# Patient Record
Sex: FEMALE | Race: BLACK OR AFRICAN AMERICAN
[De-identification: names, ages, dates, MRNs, and addresses within clinical notes are randomized per-mention and may not be internally consistent; named-entity substitution may affect disease eponyms.]

---

## 2018-03-11 ENCOUNTER — HOSPITAL ENCOUNTER (EMERGENCY)
Dept: HOSPITAL 61 - ER | Age: 23
Discharge: HOME | End: 2018-03-11
Payer: SELF-PAY
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
## 2018-03-11 DIAGNOSIS — T65.891A: Primary | ICD-10-CM

## 2018-03-11 DIAGNOSIS — H57.11: ICD-10-CM

## 2018-03-11 PROCEDURE — 99283 EMERGENCY DEPT VISIT LOW MDM: CPT

## 2018-03-11 RX ADMIN — FLUORESCEIN SODIUM 1 STRIP: 0.6 STRIP OPHTHALMIC at 20:29

## 2018-03-11 RX ADMIN — TETRACAINE HYDROCHLORIDE 1 DROP: 5 SOLUTION OPHTHALMIC at 20:29

## 2019-07-08 ENCOUNTER — HOSPITAL ENCOUNTER (EMERGENCY)
Dept: HOSPITAL 61 - ER | Age: 24
Discharge: HOME | End: 2019-07-08
Payer: SELF-PAY

## 2019-07-08 VITALS — SYSTOLIC BLOOD PRESSURE: 137 MMHG | DIASTOLIC BLOOD PRESSURE: 73 MMHG

## 2019-07-08 VITALS — HEIGHT: 63 IN | WEIGHT: 182.5 LBS | BODY MASS INDEX: 32.34 KG/M2

## 2019-07-08 DIAGNOSIS — O23.591: Primary | ICD-10-CM

## 2019-07-08 DIAGNOSIS — R10.84: ICD-10-CM

## 2019-07-08 DIAGNOSIS — R11.0: ICD-10-CM

## 2019-07-08 DIAGNOSIS — B96.89: ICD-10-CM

## 2019-07-08 DIAGNOSIS — Z3A.01: ICD-10-CM

## 2019-07-08 LAB
ALBUMIN SERPL-MCNC: 3.4 G/DL (ref 3.4–5)
ALBUMIN/GLOB SERPL: 0.8 {RATIO} (ref 1–1.7)
ALP SERPL-CCNC: 82 U/L (ref 46–116)
ALT SERPL-CCNC: 28 U/L (ref 14–59)
AMORPH SED URNS QL MICRO: PRESENT /HPF
AMPHETAMINE/METHAMPHETAMINE: (no result)
ANION GAP SERPL CALC-SCNC: 6 MMOL/L (ref 6–14)
APTT PPP: YELLOW S
AST SERPL-CCNC: 17 U/L (ref 15–37)
BACTERIA #/AREA URNS HPF: 0 /HPF
BARBITURATES UR-MCNC: (no result) UG/ML
BASOPHILS # BLD AUTO: 0.1 X10^3/UL (ref 0–0.2)
BASOPHILS NFR BLD: 1 % (ref 0–3)
BENZODIAZ UR-MCNC: (no result) UG/L
BILIRUB SERPL-MCNC: 0.2 MG/DL (ref 0.2–1)
BILIRUB UR QL STRIP: NEGATIVE
BUN SERPL-MCNC: 11 MG/DL (ref 7–20)
BUN/CREAT SERPL: 14 (ref 6–20)
CALCIUM SERPL-MCNC: 8.8 MG/DL (ref 8.5–10.1)
CANNABINOIDS UR-MCNC: (no result) UG/L
CHLORIDE SERPL-SCNC: 103 MMOL/L (ref 98–107)
CO2 SERPL-SCNC: 29 MMOL/L (ref 21–32)
COCAINE UR-MCNC: (no result) NG/ML
CREAT SERPL-MCNC: 0.8 MG/DL (ref 0.6–1)
EOSINOPHIL NFR BLD: 0.3 X10^3/UL (ref 0–0.7)
EOSINOPHIL NFR BLD: 4 % (ref 0–3)
ERYTHROCYTE [DISTWIDTH] IN BLOOD BY AUTOMATED COUNT: 14.5 % (ref 11.5–14.5)
FIBRINOGEN PPP-MCNC: (no result) MG/DL
GFR SERPLBLD BASED ON 1.73 SQ M-ARVRAT: 105.8 ML/MIN
GLOBULIN SER-MCNC: 4.3 G/DL (ref 2.2–3.8)
GLUCOSE SERPL-MCNC: 89 MG/DL (ref 70–99)
HCT VFR BLD CALC: 38.1 % (ref 36–47)
HGB BLD-MCNC: 12.6 G/DL (ref 12–15.5)
LIPASE: 137 U/L (ref 73–393)
LYMPHOCYTES # BLD: 2.1 X10^3/UL (ref 1–4.8)
LYMPHOCYTES NFR BLD AUTO: 25 % (ref 24–48)
MCH RBC QN AUTO: 27 PG (ref 25–35)
MCHC RBC AUTO-ENTMCNC: 33 G/DL (ref 31–37)
MCV RBC AUTO: 81 FL (ref 79–100)
METHADONE SERPL-MCNC: (no result) NG/ML
MONO #: 0.6 X10^3/UL (ref 0–1.1)
MONOCYTES NFR BLD: 7 % (ref 0–9)
NEUT #: 5.2 X10^3UL (ref 1.8–7.7)
NEUTROPHILS NFR BLD AUTO: 63 % (ref 31–73)
NITRITE UR QL STRIP: NEGATIVE
OPIATES UR-MCNC: (no result) NG/ML
PCP SERPL-MCNC: (no result) MG/DL
PH UR STRIP: 7.5 [PH]
PLATELET # BLD AUTO: 237 X10^3/UL (ref 140–400)
POTASSIUM SERPL-SCNC: 3.6 MMOL/L (ref 3.5–5.1)
PROT SERPL-MCNC: 7.7 G/DL (ref 6.4–8.2)
PROT UR STRIP-MCNC: NEGATIVE MG/DL
RBC # BLD AUTO: 4.72 X10^6/UL (ref 3.5–5.4)
RBC #/AREA URNS HPF: 0 /HPF (ref 0–2)
SODIUM SERPL-SCNC: 138 MMOL/L (ref 136–145)
SQUAMOUS #/AREA URNS LPF: (no result) /LPF
U PREG PATIENT: POSITIVE
UROBILINOGEN UR-MCNC: 1 MG/DL
WBC # BLD AUTO: 8.2 X10^3/UL (ref 4–11)
WBC #/AREA URNS HPF: (no result) /HPF (ref 0–4)

## 2019-07-08 PROCEDURE — 84702 CHORIONIC GONADOTROPIN TEST: CPT

## 2019-07-08 PROCEDURE — 87591 N.GONORRHOEAE DNA AMP PROB: CPT

## 2019-07-08 PROCEDURE — 81001 URINALYSIS AUTO W/SCOPE: CPT

## 2019-07-08 PROCEDURE — 76817 TRANSVAGINAL US OBSTETRIC: CPT

## 2019-07-08 PROCEDURE — 76801 OB US < 14 WKS SINGLE FETUS: CPT

## 2019-07-08 PROCEDURE — 99285 EMERGENCY DEPT VISIT HI MDM: CPT

## 2019-07-08 PROCEDURE — 85025 COMPLETE CBC W/AUTO DIFF WBC: CPT

## 2019-07-08 PROCEDURE — 80053 COMPREHEN METABOLIC PANEL: CPT

## 2019-07-08 PROCEDURE — 81025 URINE PREGNANCY TEST: CPT

## 2019-07-08 PROCEDURE — 83690 ASSAY OF LIPASE: CPT

## 2019-07-08 PROCEDURE — 36415 COLL VENOUS BLD VENIPUNCTURE: CPT

## 2019-07-08 PROCEDURE — 87491 CHLMYD TRACH DNA AMP PROBE: CPT

## 2019-07-08 PROCEDURE — 80307 DRUG TEST PRSMV CHEM ANLYZR: CPT

## 2019-07-08 NOTE — PHYS DOC
Past Medical History


Past Medical History:  No Pertinent History


 (TYRON BOLANOS)


Past Surgical History:  No Surgical History


 (TYRON BOLANOS)


Alcohol Use:  None


Drug Use:  None


 (TRYON BOLANOS)





Adult General


Chief Complaint


Chief Complaint:  ABDOMINAL PAIN IN PREGNANCY





HPI


HPI





Patient is a 24  year old female who presents with generalized 9 out of 10 

abdominal pain described as cramping that began today after she found out she is

pregnant. She states her last menstrual cycle was 2019. Patient denies 

any vaginal bleeding. She is complaining of nausea. She is a  3 para 1 

with one miscarriage. Denies anything exacerbating or relieving her pain.


 (TYRON BOLANOS)





Review of Systems


Review of Systems





Constitutional: Denies fever or chills []


Eyes: Denies change in visual acuity, redness, or eye pain []


HENT: Denies nasal congestion or sore throat []


Respiratory: Denies cough or shortness of breath []


Cardiovascular: No additional information not addressed in HPI []


GI: Reports abdominal pain, nausea, denies vomiting, bloody stools or diarrhea 

[]


: Denies dysuria or hematuria []


Musculoskeletal: Denies back pain or joint pain []


Integument: Denies rash or skin lesions []


Neurologic: Denies headache, focal weakness or sensory changes []


Endocrine: Denies polyuria or polydipsia []





All other systems were reviewed and found to be within normal limits, except as 

documented in this note.


 (TYRON BOLANOS)





Allergies


Allergies





Allergies








Coded Allergies Type Severity Reaction Last Updated Verified


 


  No Known Drug Allergies    16 No





 (SHUN BRENNER MD)





Physical Exam


Physical Exam





Constitutional: Well developed, well nourished, no acute distress, non-toxic 

appearance. []


HENT: Normocephalic, atraumatic, bilateral external ears normal, oropharynx 

moist, no oral exudates, nose normal. []


Eyes: PERRLA, EOMI, conjunctiva normal, no discharge. [] 


Neck: Normal range of motion, no tenderness, supple, no stridor. [] 


Cardiovascular:Heart rate regular rhythm, no murmur []


Lungs & Thorax:  Bilateral breath sounds clear to auscultation []


Abdomen: Bowel sounds normal, soft, no tenderness, no masses, no pulsatile 

masses. []


Pelvic exam


External pelvic appears normal, cervix is visualized, closed, no CMT, no adnexal

 tenderness, trace amount of white discharge in the vaginal vault. 


Skin: Warm, dry, no erythema, no rash. [] 


Back: No tenderness, no CVA tenderness. [] 


Extremities: No tenderness, no cyanosis, no clubbing, ROM intact, no edema. [] 


Neurologic: Alert and oriented X 3, normal motor function, normal sensory 

function, no focal deficits noted. []


Psychologic: Affect normal, judgement normal, mood normal. []


 (TYRON BOLANOS)





Current Patient Data


Vital Signs





                                   Vital Signs








  Date Time  Temp Pulse Resp B/P (MAP) Pulse Ox O2 Delivery O2 Flow Rate FiO2


 


19 18:22  89  137/73 (94)  Room Air  


 


19 17:22     99   


 


19 16:12 97.8  14     





 97.8       





 (SHUN BRENNER MD)


Lab Values





                                Laboratory Tests








Test


 19


15:30 19


16:35


 


Urine Collection Type Unknown   


 


Urine Color Yellow   


 


Urine Clarity Cloudy   


 


Urine pH 7.5   


 


Urine Specific Gravity 1.025   


 


Urine Protein


 Negative mg/dL


(NEG-TRACE) 





 


Urine Glucose (UA)


 Negative mg/dL


(NEG) 





 


Urine Ketones (Stick)


 Negative mg/dL


(NEG) 





 


Urine Blood


 Negative (NEG)


 





 


Urine Nitrite


 Negative (NEG)


 





 


Urine Bilirubin


 Negative (NEG)


 





 


Urine Urobilinogen Dipstick


 1.0 mg/dL (0.2


mg/dL) 





 


Urine Leukocyte Esterase Small (NEG)   


 


Urine RBC 0 /HPF (0-2)   


 


Urine WBC


 1-4 /HPF (0-4)


 





 


Urine Squamous Epithelial


Cells Many /LPF  


 





 


Urine Amorphous Sediment Present /HPF   


 


Urine Bacteria


 0 /HPF (0-FEW)


 





 


Urine Mucus Mod /LPF   


 


Urine Pregnancy Test


 Positive (NEG)


 





 


Urine Opiates Screen Neg (NEG)   


 


Urine Methadone Screen Neg (NEG)   


 


Urine Barbiturates Neg (NEG)   


 


Urine Phencyclidine Screen Neg (NEG)   


 


Urine


Amphetamine/Methamphetamine Neg (NEG)  


 





 


Urine Benzodiazepines Screen Neg (NEG)   


 


Urine Cocaine Screen Pos (NEG)   


 


Urine Cannabinoids Screen Neg (NEG)   


 


Urine Ethyl Alcohol Neg (NEG)   


 


White Blood Count


 


 8.2 x10^3/uL


(4.0-11.0)


 


Red Blood Count


 


 4.72 x10^6/uL


(3.50-5.40)


 


Hemoglobin


 


 12.6 g/dL


(12.0-15.5)


 


Hematocrit


 


 38.1 %


(36.0-47.0)


 


Mean Corpuscular Volume


 


 81 fL ()





 


Mean Corpuscular Hemoglobin  27 pg (25-35)  


 


Mean Corpuscular Hemoglobin


Concent 


 33 g/dL


(31-37)


 


Red Cell Distribution Width


 


 14.5 %


(11.5-14.5)


 


Platelet Count


 


 237 x10^3/uL


(140-400)


 


Neutrophils (%) (Auto)  63 % (31-73)  


 


Lymphocytes (%) (Auto)  25 % (24-48)  


 


Monocytes (%) (Auto)  7 % (0-9)  


 


Eosinophils (%) (Auto)  4 % (0-3)  H


 


Basophils (%) (Auto)  1 % (0-3)  


 


Neutrophils # (Auto)


 


 5.2 x10^3uL


(1.8-7.7)


 


Lymphocytes # (Auto)


 


 2.1 x10^3/uL


(1.0-4.8)


 


Monocytes # (Auto)


 


 0.6 x10^3/uL


(0.0-1.1)


 


Eosinophils # (Auto)


 


 0.3 x10^3/uL


(0.0-0.7)


 


Basophils # (Auto)


 


 0.1 x10^3/uL


(0.0-0.2)


 


Maternal Serum HCG Beta


Subunit 


 3767 mIU/mL


(0-5)  H


 


Sodium Level


 


 138 mmol/L


(136-145)


 


Potassium Level


 


 3.6 mmol/L


(3.5-5.1)


 


Chloride Level


 


 103 mmol/L


()


 


Carbon Dioxide Level


 


 29 mmol/L


(21-32)


 


Anion Gap  6 (6-14)  


 


Blood Urea Nitrogen


 


 11 mg/dL


(7-20)


 


Creatinine


 


 0.8 mg/dL


(0.6-1.0)


 


Estimated GFR


(Cockcroft-Gault) 


 105.8  





 


BUN/Creatinine Ratio  14 (6-20)  


 


Glucose Level


 


 89 mg/dL


(70-99)


 


Calcium Level


 


 8.8 mg/dL


(8.5-10.1)


 


Total Bilirubin


 


 0.2 mg/dL


(0.2-1.0)


 


Aspartate Amino Transferase


(AST) 


 17 U/L (15-37)





 


Alanine Aminotransferase (ALT)


 


 28 U/L (14-59)





 


Alkaline Phosphatase


 


 82 U/L


()


 


Total Protein


 


 7.7 g/dL


(6.4-8.2)


 


Albumin


 


 3.4 g/dL


(3.4-5.0)


 


Albumin/Globulin Ratio


 


 0.8 (1.0-1.7)


L


 


Lipase


 


 137 U/L


()


 


Ethyl Alcohol Level


 


 < 3 mg/dL


(0-10)





                                Laboratory Tests


19 16:35








                                Laboratory Tests


19 16:35











Microbiology


19 Wet Prep - Final, Complete


         


 (SHUN BRENNER MD)





EKG


EKG


[]


 (TYRON BOLANOS)





Radiology/Procedures


Radiology/Procedures


[]PROCEDURE: OB <14 WKS W/TV





Transabdominal and transvaginal obstetrical ultrasound less than 14 weeks.


 


HISTORY: Abdominal pain


 


Transabdominal ultrasound was performed. Uterus is normal in size and 


appearance measuring 8.7 x 3.5 x 4.7 cm. Endometrium was 1 cm. Ovaries 


were poorly cyst seen.


 


Transvaginal imaging was performed for further evaluation. There is a 


cystic focus at the fundus of the uterus possibly a gestation with a mean 


sac size of 0.4 cm corresponding to 5 weeks 1 day gestational age. A fetal


pole was not identified. Follow-up will be necessary to determine fetal 


viability.


 


Right ovary measured 4.4 x 3.3 x 2.8 cm. There is a corpus lutein cyst in 


the right ovary measuring 2.1 cm. Left ovary was normal measuring 3.1 x 


1.5 x 1.7 cm. There is flow in both ovaries with color imaging and 


Doppler. There is no free fluid in the pelvis.


 


IMPRESSION:


1. Probable gestational sac within the uterus with a mean sac size of 0.4 


cm corresponding to 5 weeks 1 day gestational age. Estimated date of 


delivery by ultrasound is 3/8/2020.


2. Follow-up will be necessary to determine fetal viability as the fetal 


pole was not identified.


 


Electronically signed by: Alton Olivia MD (2019 5:43 PM) [facility]














DICTATED and SIGNED BY:     ALTON OLIVIA MD


DATE:     19 1744


 (TYRON BOLANOS)





Course & Med Decision Making


Course & Med Decision Making


Pertinent Labs and Imaging studies reviewed. (See chart for details)





This is a 24-year-old female patient presenting to the ED today complaining of 

abdominal pain in pregnancy. Last menstrual cycle was 2019. She is a 

 3 para 1 with one miscarriage





Positive urine hCG, beta-hCG 3767, CBC, CMP-no acute findings. Urine analysis is

 noted for small amount of leukocytes this urine appears grossly contaminated.





Wet prep noted for BV-will d/c with flagyl





OB ultrasound noted for- Probable gestational sac within the uterus with a mean 

sac size of 0.4 cm corresponding to 5 weeks 1 day gestational age





Patient was provided instructions to follow-up with OB.








 (TYRON BOLANOS)


Course & Med Decision Making





Staff Physician Addendum:


I was working in the ER during the course of this patient's visit.  I was 

available for consultation as needed, but I was not directly involved in the 

care of this patient.    


 (SHUN BRENNER MD)


Dragon Disclaimer


Dragon Disclaimer


This electronic medical record was generated, in whole or in part, using a voice

 recognition dictation system.


 (TYRON BOLANOS)





Departure


Departure


Impression:  


   Primary Impression:  


   Abdominal pain in pregnancy


   Additional Impression:  


   Bacterial vaginosis in pregnancy


Disposition:   HOME, SELF-CARE


Condition:  STABLE


Referrals:  


NO PCP (PCP)








ZACHARY IVERSON MD


follow up in 1 week


Patient Instructions:  Abdominal Pain During Pregnancy, Bacterial Vaginosis





Additional Instructions:  


You were evaluated in the emergency room for abdominal pain in pregnancy, your 

ultrasound shows you are  5 weeks 1 day pregnant. You also have bacterial 

vaginosis. We put you on antibiotics. Make sure you complete them. Follow-up w

ith your own OB/GYN in the next 1 week.


Scripts


Metronidazole (FLAGYL) 500 Mg Tablet


1 TAB PO BID, #14 TAB


   Prov: TYRON BOLANOS         19





Problem Qualifiers








   Primary Impression:  


   Abdominal pain in pregnancy


   Trimester:  first trimester  Qualified Codes:  O26.891 - Other specified 

   pregnancy related conditions, first trimester; R10.9 - Unspecified abdominal 

   pain








TYRON BOLANOS               2019 17:48


SHUN BRENNER MD             2019 19:31

## 2019-07-08 NOTE — RAD
Transabdominal and transvaginal obstetrical ultrasound less than 14 weeks.

 

HISTORY: Abdominal pain

 

Transabdominal ultrasound was performed. Uterus is normal in size and 

appearance measuring 8.7 x 3.5 x 4.7 cm. Endometrium was 1 cm. Ovaries 

were poorly cyst seen.

 

Transvaginal imaging was performed for further evaluation. There is a 

cystic focus at the fundus of the uterus possibly a gestation with a mean 

sac size of 0.4 cm corresponding to 5 weeks 1 day gestational age. A fetal

pole was not identified. Follow-up will be necessary to determine fetal 

viability.

 

Right ovary measured 4.4 x 3.3 x 2.8 cm. There is a corpus lutein cyst in 

the right ovary measuring 2.1 cm. Left ovary was normal measuring 3.1 x 

1.5 x 1.7 cm. There is flow in both ovaries with color imaging and 

Doppler. There is no free fluid in the pelvis.

 

IMPRESSION:

1. Probable gestational sac within the uterus with a mean sac size of 0.4 

cm corresponding to 5 weeks 1 day gestational age. Estimated date of 

delivery by ultrasound is 3/8/2020.

2. Follow-up will be necessary to determine fetal viability as the fetal 

pole was not identified.

 

Electronically signed by: Alton Olivia MD (7/8/2019 5:43 PM) [facility]

## 2021-07-06 ENCOUNTER — HOSPITAL ENCOUNTER (EMERGENCY)
Dept: HOSPITAL 61 - ER | Age: 26
Discharge: HOME | End: 2021-07-06
Payer: COMMERCIAL

## 2021-07-06 VITALS — DIASTOLIC BLOOD PRESSURE: 76 MMHG | SYSTOLIC BLOOD PRESSURE: 124 MMHG

## 2021-07-06 VITALS — WEIGHT: 180.34 LBS | BODY MASS INDEX: 30.79 KG/M2 | HEIGHT: 64 IN

## 2021-07-06 DIAGNOSIS — J06.9: Primary | ICD-10-CM

## 2021-07-06 DIAGNOSIS — F17.200: ICD-10-CM

## 2021-07-06 PROCEDURE — 99283 EMERGENCY DEPT VISIT LOW MDM: CPT

## 2021-07-06 NOTE — PHYS DOC
Past Medical History


Past Medical History:  No Pertinent History


Past Surgical History:  No Surgical History


Smoking Status:  Current Every Day Smoker


Alcohol Use:  None


Drug Use:  None





General Adult


EDM:


Chief Complaint:  MULTIPLE COMPLAINTS





HPI:


HPI:





Patient is a 26  year old female presents emergency department chief complaint 

of upper respiratory infection signs and symptoms for the past 3 days.  Patient 

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

she has a "URI "and she should go to the ER and get started on Sudafed and Alis

dryl.  Patient states she was given a summer cold cocktail that contained 

vitamin C and B12 supplements that did not seem to help today.  Patient states 

that she has nasal congestion with a dry cough and sinus pains that seem to get 

worse at night, when she wakes up and goes to work her symptoms seem to resolve 

however at night she has a hard time sleeping because of her congestion.  

Patient currently denies chest pains, shortness of breath, throat pain, ear 

pains, visual changes, headaches, or dizziness.  Patient denies nausea, 

vomiting, diarrhea, constipation or abdominal pains.  Patient denies fever or 

chills.  Patient denies rashes of her skin.  Patient denies any other physical 

complaints or physical concerns.





Review of Systems:


Review of Systems:


14 body systems of review of systems have been reviewed.  See HPI for pertinent 

positives and negative responses, otherwise all other systems are negative, 

nonpertinent or noncontributory.


Constitutional: Negative except as outlined in HPI above.


Skin: Negative except as outlined in HPI above.


Eyes:   Negative except as outlined in HPI above.


HENT: Negative except as outlined in HPI above.


Respiratory:   Negative except as outlined in HPI above.


Cardiovascular:   Negative except as outlined in HPI above.


GI:   Negative except as outlined in HPI above.


:  Negative except as outlined in HPI above.


Musculoskeletal:   Negative except as outlined in HPI above.


Integument:   Negative except as outlined in HPI above.


Neurologic:   Negative except as outlined in HPI above.


Endocrine:   Negative except as outlined in HPI above.


Lymphatic:  Negative except as outlined in HPI above.


Psychiatric:  Negative except as outlined in HPI above.





Heart Score:


C/O Chest Pain:  No


Risk Factors:


Risk Factors:  DM, Current or recent (<one month) smoker, HTN, HLP, family hi

story of CAD, obesity.


Risk Scores:


Score 0 - 3:  2.5% MACE over next 6 weeks - Discharge Home


Score 4 - 6:  20.3% MACE over next 6 weeks - Admit for Clinical Observation


Score 7 - 10:  72.7% MACE over next 6 weeks - Early Invasive Strategies





Allergies:


Allergies:





Allergies








Coded Allergies Type Severity Reaction Last Updated Verified


 


  No Known Drug Allergies    9/26/16 No











Physical Exam:


PE:


General: Appears well, nontoxic, uncomfortable, 26-year-old female no apparent 

distress.


Skin: Warm, dry.  Normal for ethnicity.


HEENT: Atraumatic, PERRLA, rhinorrhea and congestion, nasal turbinates boggy 

bilaterally.  Moist mucous membranes, uvula midline, maintaining secretions, no 

phonation changes.  No lymphadenopathy of the head or neck appreciated.  No 

postnasal drip.


Neck: Trachea midline, normal ROM.  No stridor.


Respiratory: Normal WOB.  CTAB without R/R.  No tachypnea.  No adventitious lung

sounds appreciated.


Cardiovascular: Regular rate and rhythm, normal peripheral perfusion, no 

cyanosis appreciated.


Abdomen: Soft, nontender, no distention.


Back: Normal range of motion.


Musculoskeletal: No swelling or deformity.


Neuro: Alert and oriented x4.  MAEE.


Psych: Normal affect and mood.





Current Patient Data:


Vital Signs:





                                   Vital Signs








  Date Time  Temp Pulse Resp B/P (MAP) Pulse Ox O2 Delivery O2 Flow Rate FiO2


 


7/6/21 19:52 98.6 94 20 141/80 (94) 98 Room Air  





 98.6       











EKG:


EKG:


[]





Radiology/Procedures:


Radiology/Procedures:


[]





Course & Med Decision Making:


Course & Med Decision Making


Pertinent Labs and Imaging studies reviewed. (See chart for details)





26-year-old female, vital signs reviewed, presents emergency department with 

complaints of upper respiratory infection type signs and symptoms.  Physical 

presentation and examination consistent with viral URI.  Discussed findings with

 patient, will start with Benadryl and Sudafed here in the emergency department.

  Recommended to patient over-the-counter Flonase, Benadryl, Sudafed, Zyrtec or 

Claritin, Robitussin-DM if chest congestion signs and symptoms arise.





Patient is asking for work excuse, patient will be given work excuse for today 

and tomorrow.





Patient gave verbal understanding of discharge home instructions, follow-up with

 PCP soon, over-the-counter medication use, return to ER precautions and 

concerns, patient was hemodynamically stable, nontoxic in appearance, in no 

apparent distress at discharge time, patient was discharged home without 

incident.





Dragon Disclaimer:


Dragon Disclaimer:


This electronic medical record was generated, in whole or in part, using a voice

 recognition dictation system.





Departure


Departure


Impression:  


   Primary Impression:  


   URI (upper respiratory infection)


   Qualified Codes:  J06.9 - Acute upper respiratory infection, unspecified


Disposition:  01 HOME / SELF CARE / HOMELESS


Condition:  GOOD


Referrals:  


NO PCP (PCP)


Patient Instructions:  Upper Respiratory Infection, Adult





Additional Instructions:  


You were seen today in the emergency department for upper respiratory infection 

type signs and symptoms.  We have discussed home treatment with Benadryl, Zyrtec

 or Claritin, over-the-counter Flonase nasal spray, Benadryl.  Please obtain 

these from your pharmacy and take as directed.  We also discussed if symptoms 

progress to your chest and you develop a productive cough, you may also start 

with over-the-counter Robitussin-DM.  However I do not believe you need 

Robitussin-DM at this time.  Please increase your fluid intake as this will help

 loosen secretions and lessen symptoms.  Please follow-up with your primary care

 physician soon for reevaluation.  I have provided you with a list of healthcare

 community resources to find a primary healthcare provider.  Please return to 

the emergency department for worsening symptoms or other concerns.  It was a 

pleasure taking care of you today in the emergency department and I thank you 

for allowing me to participate in your emergency healthcare needs.





EMERGENCY DEPARTMENT GENERAL DISCHARGE INSTRUCTIONS





Thank you for coming to [facility] Emergency Department (ED) 

today and 


trusting us with you care.  We trust that you had a positive experience in our 

Emergency 


Department.  If you wish to speak to the department management, you may call the

 Director at 


[phone].





YOUR FOLLOW UP INSTRUCTIONS ARE AS FOLLOWS:





1.  Do you have a private Doctor?  If you do not have a private doctor, please 

ask for a 


resource list of physicians or clinics that may be able to assist you with 

follow up care.





2.  The Emergency Physicain has interpreted your x-rays.  The X-Ray specialist 

will also 


review them.  If there is a change in the findings, you will be notified in 48 

hours when at 


all possible.





3.  A lab test or culture has been done, your results will be reviewed and you 

will be 


notified if you need a change in treatment.





ADDITIONAL INSTRUCTIONS AND INFORMATION:





1.  Your care today has been supervised by a physician who is specially trained 

in emergency 


care.  Many problems require more than one evaluation for a complete diagnosis 

and 


treatment.  We recommend that you schedule your follow up appointment as 

recommended to 


ensure complete treatment of you illness or injury.  If you are unable to obtain

 follow up 


care and continue to have a problem, or if your condition worsens, we recommend 

that you 


return to the ED.





2.  We are not able to safely determine your condition over the phone nor are we

 able to 


give sound medical advice over the phone.  For these safety reasons, if you call

 for medical 


advice we will ask you to come to the ED for further evaluation.





3.  If you have any questions regarding these discharge instructions please call

 the ED at 


[phone].





SAFETY INFORMATION:





In the interest of safety, wellness, and injury prevention; we encourage you to 

wear your 


sealbelt, if you smoke; quite smoking, and we encourage family to use a 

protective helmet 


for bicycling and other sporting events that present an increased risk for head 

injury.





IF YOUR SYMPTOMS WORSEN OR NEW SYMPTOMS DEVELOP, OR YOU HAVE CONCERNS ABOUT YOUR

 CONDITION; 


OR IF YOUR CONDITION WORSENS WHILE YOU ARE WAITING FOR YOUR FOLLOW UP APPOINT

MENT; EITHER 


CONTACT YOUR PRIMARY CARE DOCTOR, THE PHYSICIAN WHOSE NAME AND NUMBER YOU WERE 

GIVEN, OR 


RETURN TO THE ED IMMEDIATELY.











YULISA VAZ        Jul 6, 2021 21:03